# Patient Record
Sex: MALE | Race: BLACK OR AFRICAN AMERICAN | NOT HISPANIC OR LATINO | Employment: FULL TIME | URBAN - METROPOLITAN AREA
[De-identification: names, ages, dates, MRNs, and addresses within clinical notes are randomized per-mention and may not be internally consistent; named-entity substitution may affect disease eponyms.]

---

## 2017-07-27 ENCOUNTER — HOSPITAL ENCOUNTER (EMERGENCY)
Facility: HOSPITAL | Age: 22
Discharge: HOME | End: 2017-07-27
Admitting: EMERGENCY MEDICINE
Payer: COMMERCIAL

## 2017-07-27 ENCOUNTER — APPOINTMENT (EMERGENCY)
Dept: RADIOLOGY | Facility: HOSPITAL | Age: 22
End: 2017-07-27
Payer: COMMERCIAL

## 2017-07-27 VITALS
OXYGEN SATURATION: 99 % | WEIGHT: 163 LBS | HEART RATE: 69 BPM | SYSTOLIC BLOOD PRESSURE: 128 MMHG | RESPIRATION RATE: 18 BRPM | TEMPERATURE: 97.1 F | DIASTOLIC BLOOD PRESSURE: 58 MMHG

## 2017-07-27 DIAGNOSIS — S93.401A RIGHT ANKLE SPRAIN: Primary | ICD-10-CM

## 2017-07-27 PROCEDURE — 99283 EMERGENCY DEPT VISIT LOW MDM: CPT

## 2017-07-27 PROCEDURE — 73610 X-RAY EXAM OF ANKLE: CPT

## 2017-07-27 RX ORDER — NAPROXEN 500 MG/1
500 TABLET ORAL 2 TIMES DAILY WITH MEALS
Qty: 20 TABLET | Refills: 0 | Status: SHIPPED | OUTPATIENT
Start: 2017-07-27 | End: 2017-08-06

## 2017-07-27 RX ORDER — NAPROXEN 500 MG/1
500 TABLET ORAL ONCE
Status: COMPLETED | OUTPATIENT
Start: 2017-07-27 | End: 2017-07-27

## 2017-07-27 RX ADMIN — NAPROXEN 500 MG: 500 TABLET ORAL at 11:32

## 2017-08-03 ENCOUNTER — HOSPITAL ENCOUNTER (EMERGENCY)
Facility: HOSPITAL | Age: 22
Discharge: HOME | End: 2017-08-03
Attending: EMERGENCY MEDICINE | Admitting: EMERGENCY MEDICINE
Payer: COMMERCIAL

## 2017-08-03 VITALS
HEART RATE: 67 BPM | SYSTOLIC BLOOD PRESSURE: 124 MMHG | WEIGHT: 163 LBS | DIASTOLIC BLOOD PRESSURE: 57 MMHG | RESPIRATION RATE: 20 BRPM | TEMPERATURE: 98 F | OXYGEN SATURATION: 100 %

## 2017-08-03 DIAGNOSIS — S93.409A ANKLE SPRAIN: Primary | ICD-10-CM

## 2017-08-03 PROCEDURE — 99282 EMERGENCY DEPT VISIT SF MDM: CPT

## 2017-12-24 ENCOUNTER — HOSPITAL ENCOUNTER (EMERGENCY)
Facility: HOSPITAL | Age: 22
Discharge: HOME | End: 2017-12-24
Attending: EMERGENCY MEDICINE | Admitting: EMERGENCY MEDICINE
Payer: COMMERCIAL

## 2017-12-24 VITALS
RESPIRATION RATE: 16 BRPM | OXYGEN SATURATION: 100 % | TEMPERATURE: 97 F | SYSTOLIC BLOOD PRESSURE: 125 MMHG | DIASTOLIC BLOOD PRESSURE: 60 MMHG | HEART RATE: 69 BPM

## 2017-12-24 DIAGNOSIS — F10.929 ACUTE ALCOHOL INTOXICATION (HCC): Primary | ICD-10-CM

## 2017-12-24 PROCEDURE — 99284 EMERGENCY DEPT VISIT MOD MDM: CPT

## 2017-12-24 NOTE — ED CARE HANDOFF
Emergency Department Sign Out Note        Sign out and transfer of care from ***  See Separate Emergency Department note  The patient, Eusebio Avalos, was evaluated by the previous provider for ***  Workup Completed:  ***    ED Course / Workup Pending (followup):  ***                          ED Course      Procedures  MDM  CritCare Time      Disposition  Final diagnoses:   Acute alcohol intoxication ([facility] [address] )     Time reflects when diagnosis was documented in both MDM as applicable and the Disposition within this note     Time User Action Codes Description Comment    12/24/2017  4:23 AM Dennise Trejo Add [F10 949] Acute alcohol intoxication [facility])       ED Disposition     None      Follow-up Information     Follow up With Specialties Details Why Contact Info    Infolink  Call Referral to a [phone]          Patient's Medications   Discharge Prescriptions    No medications on file     No discharge procedures on file         ED Provider  Electronically Signed by

## 2017-12-24 NOTE — ED PROVIDER NOTES
History  Chief Complaint   Patient presents with    Alcohol Intoxication     found in [employer] vomiting in the bathroom; states he was partying all day     This is a 49-year-old male presents for evaluation of alcohol intoxication  Patient was partying all day had multiple shots at the dance club  Decided to leave the [address] and his friends and went to [employer] where he was found vomiting in the bathroom  He stated the bathroom approximately 45 minutes and was not opening the door because he states that he was busy vomiting and insisted it was only 20 minutes despite what the staff said  When he was not coming out police were called, the open the door, he was able to explain the situation but was obviously intoxicated so he was brought in for evaluation  He denies any falls denies any loss of consciousness or hitting his head  There is no external evidence of trauma, currently no complaints the patient states that he wants to sleep since he has been drinking all day and has been up since 5 in the morning  Patient lives with brother and appears to be at a state however states that his dad lives nearby in [place]  He denies any headache, vision changes, nausea, vomiting, diarrhea since he came to the emergency department  He states that he just wants to sleep   Denies any other drug use  He is otherwise healthy and has no daily medications            Prior to Admission Medications   Prescriptions Last Dose Informant Patient Reported? Taking?   naproxen (EC NAPROSYN) 500 MG EC tablet   No No   Sig: Take 1 tablet by mouth 2 (two) times a day with meals for 10 days      Facility-Administered Medications: None       History reviewed  No pertinent past medical history  History reviewed  No pertinent surgical history  History reviewed  No pertinent family history  I have reviewed and agree with the history as documented      Social History   Substance Use Topics    Smoking status: Never Smoker    Smokeless tobacco: Never Used    Alcohol use Yes        Review of Systems   Constitutional: Negative for appetite change, chills and fever  HENT: Negative for rhinorrhea and sore throat  Eyes: Negative for photophobia and visual disturbance  Respiratory: Negative for cough and shortness of breath  Cardiovascular: Negative for chest pain and palpitations  Gastrointestinal: Positive for vomiting  Negative for abdominal pain and diarrhea  Genitourinary: Negative for dysuria, frequency and urgency  Skin: Negative for rash  Neurological: Negative for dizziness and weakness  All other systems reviewed and are negative  Physical Exam  ED Triage Vitals   Temperature Pulse Respirations Blood Pressure SpO2   12/24/17 0045 12/24/17 0045 12/24/17 0045 12/24/17 0045 12/24/17 0045   (!) 97 °F (36 1 °C) 70 18 114/70 97 %      Temp Source Heart Rate Source Patient Position - Orthostatic VS BP Location FiO2 (%)   12/24/17 0045 12/24/17 0849 12/24/17 0849 12/24/17 0849 --   Tympanic Monitor Lying Right arm       Pain Score       12/24/17 0045       No Pain           Orthostatic Vital Signs  Vitals:    12/24/17 0428 12/24/17 0604 12/24/17 0849 12/24/17 0900   BP:   125/60 125/60   Pulse: 66 63 69    Patient Position - Orthostatic VS:   Lying        Physical Exam   Constitutional: He is oriented to person, place, and time  He appears well-developed and well-nourished  HENT:   Head: Normocephalic and atraumatic  Right Ear: External ear normal    Left Ear: External ear normal    Mouth/Throat: Oropharynx is clear and moist    Eyes: Conjunctivae and EOM are normal  Pupils are equal, round, and reactive to light  Neck: Normal range of motion  Neck supple  No JVD present  No tracheal deviation present  Cardiovascular: Normal rate, regular rhythm and normal heart sounds  Exam reveals no gallop and no friction rub  No murmur heard  Pulmonary/Chest: Effort normal  No stridor   No respiratory distress  He has no wheezes  He has no rales  Abdominal: Soft  He exhibits no distension and no mass  There is no tenderness  There is no rebound and no guarding  Musculoskeletal: Normal range of motion  He exhibits no edema  Neurological: He is alert and oriented to person, place, and time  No cranial nerve deficit  He exhibits normal muscle tone  No dysmetria pupils equal and reactive   Skin: Skin is warm and dry  No rash noted  No erythema  No pallor  Psychiatric: He has a normal mood and affect  Nursing note and vitals reviewed  ED Medications  Medications - No data to display    Diagnostic Studies  Results Reviewed     None                 No orders to display         Procedures  Procedures      Phone Consults  ED Phone Contact    ED Course  ED Course                                MDM  Number of Diagnoses or Management Options  Diagnosis management comments: This is a 57-year-old male presents for evaluation of alcohol intoxication  Currently no complaints, will monitor patient clinically and discharge when sober or with sober friend or family member    CritCare Time    Disposition  Final diagnoses:   Acute alcohol intoxication ([address] )     Time reflects when diagnosis was documented in both MDM as applicable and the Disposition within this note     Time User Action Codes Description Comment    12/24/2017  4:23 AM Nargis Rao Add [F10 929] Acute alcohol intoxication [facility])       ED Disposition     ED Disposition Condition Comment    Discharge  Juan Ramsaybody discharge to home/self care                      Follow-up Information     Follow up With Specialties Details Why Contact Info    Infolink  Call Referral to a -[phone]          Discharge Medication List as of 12/24/2017  4:23 AM      CONTINUE these medications which have NOT CHANGED    Details   naproxen (EC NAPROSYN) 500 MG EC tablet Take 1 tablet by mouth 2 (two) times a day with meals for 10 days, Starting u 7/27/2017, Until Sun 8/6/2017, Print           No discharge procedures on file  ED Provider  Attending physically available and evaluated Marc Muller  CECELIA managed the patient along with the ED Attending      Electronically Signed by         Samanta Cook MD  Resident  12/24/17 0925

## 2017-12-24 NOTE — ED NOTES
Pt asleep on left side, with easy respirations on Ra   No complaints offered     Kamari Early, RN  12/24/17 6390

## 2017-12-24 NOTE — ED ATTENDING ATTESTATION
Francisco Chung MD, saw and evaluated the patient  I have discussed the patient with the resident/non-physician practitioner and agree with the resident's/non-physician practitioner's findings, Plan of Care, and MDM as documented in the resident's/non-physician practitioner's note, except where noted  All available labs and Radiology studies were reviewed  At this point I agree with the current assessment done in the Emergency Department  I have conducted an independent evaluation of this patient a history and physical is as follows:      Critical Care Time  CritCare Time     OA: 24 y/o m found in the [employer] after "drinking all day " pt states he stopped drinking a few hours ago  Denies trauma or injury  States that he left his friends at a party, was walking and vomited from drinking  He is oriented and cooperative but intoxicated  Family lives in [place]  PE, well developed m in NAD, VSS, Nc/AT, MMM, PERRL, - hemotympanum, neck supple/FROM, RR, lungs CTAB, abd soft, NT/ND, +BS, -r/g, SRIVASTAVA, intact distal pulses and capillary refill < 2 sec, AAOx3   A/p EtOH, monitor, observe, will require re-evaluation and sober prior to dc    Procedures

## 2017-12-24 NOTE — ED NOTES
Pt ambulated to Br, with slightly unsteady gait, this RN offered assistance, pt refused, this RN accompanied pt to Br, where he voided, and returned to bed with this RN at side, returned to bed, repositioned self, and asked this RN to call his father for a ride home, he gave this number [phone]     Brianne Blackmon RN  12/24/17 9583

## 2017-12-24 NOTE — DISCHARGE INSTRUCTIONS
Alcohol Intoxication   WHAT YOU NEED TO KNOW:   Alcohol intoxication is a harmful physical condition caused when you drink more alcohol than your body can handle  It is also called ethanol poisoning, or being drunk  DISCHARGE INSTRUCTIONS:   Medicine: You may be given medicine to manage the signs and symptoms of alcohol intoxication  Take your medicine as directed  Contact your healthcare provider if you think your medicine is not helping or if you have side effects  Tell him if you are allergic to any medicine  Keep a list of the medicines, vitamins, and herbs you take  Include the amounts, and when and why you take them  Bring the list or the pill bottles to follow-up visits  Keep the list with you in case of emergency  Follow up with your healthcare provider as directed:  Write down your questions so you remember to ask them during your visits  Limit or avoid alcohol:  Men should not have more than 2 drinks per day  Women should not have more than 1 drink per day  A drink is 12 ounces of beer, 5 ounces of wine, or 1½ ounces of liquor  Do not drive or operate machines when you drink alcohol:  Make sure you always have someone to drive you when you drink alcohol  For more information:   · Alcoholics Anonymous  Web Address: http://www [employer]/  Contact your healthcare provider if:   · You need help to stop drinking alcohol  · You have trouble with work or school because you drink too much alcohol  · You have physical or verbal fights because of alcohol  · You have questions or concerns about your condition or care  Return to the emergency department if:   · You have sudden trouble breathing or chest pain  · You have a seizure  · You feel sad enough to harm yourself or others  · You have hallucinations (you see or hear things that are not real)  · You cannot stop vomiting  · You were in an accident because of alcohol    © 2017 [address] [employer] Information is for End User's use only and may not be sold, redistributed or otherwise used for commercial purposes  All illustrations and images included in CareNotes® are the copyrighted property of [employer]  or [address]  The above information is an  only  It is not intended as medical advice for individual conditions or treatments  Talk to your doctor, nurse or pharmacist before following any medical regimen to see if it is safe and effective for you

## 2023-07-20 ENCOUNTER — HOSPITAL ENCOUNTER (EMERGENCY)
Facility: HOSPITAL | Age: 28
Discharge: HOME | End: 2023-07-20
Attending: EMERGENCY MEDICINE
Payer: OTHER GOVERNMENT

## 2023-07-20 VITALS
WEIGHT: 205 LBS | SYSTOLIC BLOOD PRESSURE: 145 MMHG | HEART RATE: 89 BPM | RESPIRATION RATE: 20 BRPM | TEMPERATURE: 98.1 F | DIASTOLIC BLOOD PRESSURE: 76 MMHG | OXYGEN SATURATION: 97 %

## 2023-07-20 DIAGNOSIS — Z47.89 PROBLEM WITH FIBERGLASS CAST: Primary | ICD-10-CM

## 2023-07-20 PROCEDURE — 29515 APPLICATION SHORT LEG SPLINT: CPT | Performed by: PHYSICIAN ASSISTANT

## 2023-07-20 PROCEDURE — 99283 EMERGENCY DEPT VISIT LOW MDM: CPT | Performed by: PHYSICIAN ASSISTANT

## 2023-07-20 PROCEDURE — 99282 EMERGENCY DEPT VISIT SF MDM: CPT

## 2023-07-20 RX ORDER — OXYCODONE HYDROCHLORIDE AND ACETAMINOPHEN 5; 325 MG/1; MG/1
1 TABLET ORAL EVERY 4 HOURS PRN
COMMUNITY

## 2023-07-20 NOTE — ED PROVIDER NOTES
History  Chief Complaint   Patient presents with   • Cast Problem     Had right foot surgery on 6/30, has a cast. Cast got wet last night. Told to come to the er for cast removal and replacement. 31 y/o male presenting for evaluation of his right lower leg cast. Relays he recently had it replaced however last evening a large amount of water got into the cast while showering. Was told by his ortho office to come to ED for removal and replacement. Patient's care is out of [place]. Had surgery on 6/20. No severe pain. I made patient aware before we remove the cast that we do not place casts, only splints. He acknowledges this. Denies numbness, paresthesias, skin discoloration. Prior to Admission Medications   Prescriptions Last Dose Informant Patient Reported? Taking?   naproxen (EC NAPROSYN) 500 MG EC tablet   No No   Sig: Take 1 tablet by mouth 2 (two) times a day with meals for 10 days   oxyCODONE-acetaminophen (PERCOCET) 5-325 mg per tablet 7/20/2023  Yes Yes   Sig: Take 1 tablet by mouth every 4 (four) hours as needed for moderate pain      Facility-Administered Medications: None       History reviewed. No pertinent past medical history. History reviewed. No pertinent surgical history. History reviewed. No pertinent family history. I have reviewed and agree with the history as documented. E-Cigarette/Vaping     E-Cigarette/Vaping Substances     Social History     Tobacco Use   • Smoking status: Never   • Smokeless tobacco: Never   Substance Use Topics   • Alcohol use: Yes   • Drug use: No       Review of Systems   Constitutional: Negative. Negative for chills, fatigue and fever. HENT: Negative. Negative for congestion, postnasal drip, rhinorrhea and sore throat. Eyes: Negative. Respiratory: Negative. Negative for cough, shortness of breath and wheezing. Cardiovascular: Negative. Gastrointestinal: Negative. Negative for abdominal pain, diarrhea, nausea and vomiting. Endocrine: Negative. Genitourinary: Negative. Musculoskeletal: Negative for arthralgias, back pain, gait problem, joint swelling, myalgias, neck pain and neck stiffness. Skin: Negative. Neurological: Negative. Hematological: Negative. Psychiatric/Behavioral: Negative. All other systems reviewed and are negative. Physical Exam  Physical Exam  Vitals and nursing note reviewed. Constitutional:       Appearance: Normal appearance. HENT:      Head: Normocephalic and atraumatic. Right Ear: External ear normal.      Left Ear: External ear normal.      Nose: Nose normal.   Eyes:      Conjunctiva/sclera: Conjunctivae normal.   Cardiovascular:      Rate and Rhythm: Normal rate. Pulmonary:      Effort: Pulmonary effort is normal.   Abdominal:      General: There is no distension. Musculoskeletal:         General: No deformity. Normal range of motion. Cervical back: Normal range of motion. Legs:    Skin:     General: Skin is dry. Findings: No rash. Neurological:      General: No focal deficit present. Mental Status: He is alert and oriented to person, place, and time. Mental status is at baseline. Psychiatric:         Mood and Affect: Mood normal.         Behavior: Behavior normal.         Thought Content:  Thought content normal.         Judgment: Judgment normal.         Vital Signs  ED Triage Vitals   Temperature Pulse Respirations Blood Pressure SpO2   07/20/23 1019 07/20/23 1019 07/20/23 1019 07/20/23 1019 07/20/23 1019   99.4 °F (37.4 °C) 100 18 139/61 99 %      Temp Source Heart Rate Source Patient Position - Orthostatic VS BP Location FiO2 (%)   07/20/23 1122 07/20/23 1122 07/20/23 1122 07/20/23 1122 --   Oral Monitor Sitting Left arm       Pain Score       07/20/23 1019       No Pain           Vitals:    07/20/23 1019 07/20/23 1122   BP: 139/61 145/76   Pulse: 100 89   Patient Position - Orthostatic VS:  Sitting         Visual Acuity      ED Medications  Medications - No data to display    Diagnostic Studies  Results Reviewed     None                 No orders to display              Procedures  Splint application    Date/Time: 7/20/2023 11:17 AM    Performed by: Jordyn Cuevas PA-C  Authorized by: Jordyn Cuevas PA-C  Universal Protocol:  Consent: Verbal consent obtained. Risks and benefits: risks, benefits and alternatives were discussed  Consent given by: patient  Time out: Immediately prior to procedure a "time out" was called to verify the correct patient, procedure, equipment, support staff and site/side marked as required. Timeout called at: 7/20/2023 11:17 AM.  Patient understanding: patient states understanding of the procedure being performed  Patient consent: the patient's understanding of the procedure matches consent given  Procedure consent: procedure consent matches procedure scheduled  Relevant documents: relevant documents present and verified  Test results: test results available and properly labeled  Site marked: the operative site was marked  Radiology Images displayed and confirmed. If images not available, report reviewed: imaging studies available  Required items: required blood products, implants, devices, and special equipment available  Patient identity confirmed: verbally with patient      Pre-procedure details:     Sensation:  Normal  Procedure details:     Laterality:  Right    Location:  Ankle    Ankle:  R ankle    Splint type: posterior. Supplies:  Cotton padding, elastic bandage and fiberglass  Post-procedure details:     Pain:  Unchanged    Sensation:  Normal             ED Course                               SBIRT 20yo+    Flowsheet Row Most Recent Value   Initial Alcohol Screen: US AUDIT-C     1. How often do you have a drink containing alcohol? 0 Filed at: 07/20/2023 1021   2. How many drinks containing alcohol do you have on a typical day you are drinking? 0 Filed at: 07/20/2023 1021   3a.  Male UNDER 65: How often do you have five or more drinks on one occasion? 0 Filed at: 07/20/2023 1021   3b. FEMALE Any Age, or MALE 65+: How often do you have 4 or more drinks on one occassion? 0 Filed at: 07/20/2023 1021   Audit-C Score 0 Filed at: 07/20/2023 1021   MARY KATE: How many times in the past year have you. .. Used an illegal drug or used a prescription medication for non-medical reasons? Never Filed at: 07/20/2023 1021                    Medical Decision Making  Cast removed without difficulty. Patient placed in a posterior splint. No ortho AP listed in house today and I attempted multiple times to reach patient's orthopedic at [phone] Dr. Gabrielle Zhao multiple times without success. Will have patient remain nonweightbearing and follow up with orthopedics as needed if he would like cast placement. Otherwise he has an appointment with his ortho in [place] on 8/1. Discussed with patient to keep splint dry. Patient agreeable and happy with care. Patient is informed to return to the emergency department for worsening of symptoms and was given proper education regarding their diagnosis and symptoms. Otherwise the patient is informed to follow up with their orthopedic for re-evaluation. The patient verbalizes understanding and agrees with above assessment and plan. All questions were answered. Problem with fiberglass cast: acute illness or injury  Risk  OTC drugs. Disposition  Final diagnoses:   Problem with fiberglass cast     Time reflects when diagnosis was documented in both MDM as applicable and the Disposition within this note     Time User Action Codes Description Comment    7/20/2023 11:16 AM Sage Knox [Z47.89] Problem with fiberglass cast       ED Disposition     ED Disposition   Discharge    Condition   Stable    Date/Time   Thu Jul 20, 2023 11:16 AM    Comment   Vero Hoyos discharge to home/self care.                Follow-up Information     Follow up With Specialties Details Why Contact Info Additional [address] [facility] Emergency Department Emergency Medicine Go to  If symptoms worsen otherwise schedule an appointment with ortho [address] [address] [facility] Emergency Department, [address] [facility] [address], [facility], [address] [place] [address] Orthopedic Surgery Schedule an appointment as soon as possible for a visit  for cast placement [address] [facility], [address] [place], [place] [address]  [phone]             Patient's Medications   Discharge Prescriptions    No medications on file       No discharge procedures on file.     PDMP Review     None          ED Provider  Electronically Signed by           Paola Loyd PA-C  07/20/23 1127

## 2024-09-26 DIAGNOSIS — Z00.6 ENCOUNTER FOR EXAMINATION FOR NORMAL COMPARISON OR CONTROL IN CLINICAL RESEARCH PROGRAM: ICD-10-CM

## 2024-10-14 ENCOUNTER — APPOINTMENT (OUTPATIENT)
Dept: LAB | Facility: HOSPITAL | Age: 29
End: 2024-10-14
Attending: PATHOLOGY

## 2024-10-14 DIAGNOSIS — Z00.6 ENCOUNTER FOR EXAMINATION FOR NORMAL COMPARISON OR CONTROL IN CLINICAL RESEARCH PROGRAM: ICD-10-CM

## 2024-10-14 PROCEDURE — 36415 COLL VENOUS BLD VENIPUNCTURE: CPT

## 2024-10-25 LAB
APOB+LDLR+PCSK9 GENE MUT ANL BLD/T: NOT DETECTED
BRCA1+BRCA2 DEL+DUP + FULL MUT ANL BLD/T: NOT DETECTED
MLH1+MSH2+MSH6+PMS2 GN DEL+DUP+FUL M: NOT DETECTED